# Patient Record
Sex: MALE
[De-identification: names, ages, dates, MRNs, and addresses within clinical notes are randomized per-mention and may not be internally consistent; named-entity substitution may affect disease eponyms.]

---

## 2017-04-18 ENCOUNTER — RESULT REVIEW (OUTPATIENT)
Age: 72
End: 2017-04-18

## 2019-12-06 ENCOUNTER — APPOINTMENT (OUTPATIENT)
Dept: SURGICAL ONCOLOGY | Facility: CLINIC | Age: 74
End: 2019-12-06
Payer: MEDICARE

## 2019-12-06 VITALS
BODY MASS INDEX: 34.01 KG/M2 | HEART RATE: 58 BPM | WEIGHT: 265 LBS | DIASTOLIC BLOOD PRESSURE: 92 MMHG | RESPIRATION RATE: 16 BRPM | HEIGHT: 74 IN | OXYGEN SATURATION: 93 % | SYSTOLIC BLOOD PRESSURE: 159 MMHG

## 2019-12-06 PROCEDURE — 99215 OFFICE O/P EST HI 40 MIN: CPT

## 2019-12-06 RX ORDER — RIVAROXABAN 20 MG/1
20 TABLET, FILM COATED ORAL
Refills: 0 | Status: ACTIVE | COMMUNITY

## 2019-12-06 RX ORDER — SIMVASTATIN 40 MG/1
40 TABLET, FILM COATED ORAL
Refills: 0 | Status: ACTIVE | COMMUNITY

## 2019-12-06 RX ORDER — TAMSULOSIN HYDROCHLORIDE 0.4 MG/1
CAPSULE ORAL
Refills: 0 | Status: ACTIVE | COMMUNITY

## 2019-12-12 ENCOUNTER — OUTPATIENT (OUTPATIENT)
Dept: OUTPATIENT SERVICES | Facility: HOSPITAL | Age: 74
LOS: 1 days | End: 2019-12-12
Payer: MEDICARE

## 2019-12-12 ENCOUNTER — RESULT REVIEW (OUTPATIENT)
Age: 74
End: 2019-12-12

## 2019-12-12 DIAGNOSIS — M25.9 JOINT DISORDER, UNSPECIFIED: Chronic | ICD-10-CM

## 2019-12-12 DIAGNOSIS — C43.4 MALIGNANT MELANOMA OF SCALP AND NECK: ICD-10-CM

## 2019-12-12 PROCEDURE — 88321 CONSLTJ&REPRT SLD PREP ELSWR: CPT

## 2019-12-16 LAB — SURGICAL PATHOLOGY STUDY: SIGNIFICANT CHANGE UP

## 2019-12-24 ENCOUNTER — TRANSCRIPTION ENCOUNTER (OUTPATIENT)
Age: 74
End: 2019-12-24

## 2020-01-07 ENCOUNTER — APPOINTMENT (OUTPATIENT)
Dept: PLASTIC SURGERY | Facility: CLINIC | Age: 75
End: 2020-01-07
Payer: MEDICARE

## 2020-01-07 VITALS — HEIGHT: 74 IN | WEIGHT: 260 LBS | BODY MASS INDEX: 33.37 KG/M2

## 2020-01-07 PROCEDURE — 99203 OFFICE O/P NEW LOW 30 MIN: CPT

## 2020-01-12 NOTE — PHYSICAL EXAM
[NI] : Normal [de-identified] : scalp midline  biopsy site, without any residual pigmentation or overlying eschar,

## 2020-01-12 NOTE — REASON FOR VISIT
[Consultation] : a consultation visit [FreeTextEntry1] : Pt presents in the office today by request of Dr. Galindo for melanoma on left scalp. Pt states that his dermatologist discovered it around October and had it biopsied in November which results confirmed melanoma. Pt states he has surgery scheduled 1/27/2020 with Dr. Galindo for wide excision of left scalp melanoma. Pt also states he has had no previous treatment prior to this.

## 2020-01-12 NOTE — HISTORY OF PRESENT ILLNESS
[FreeTextEntry1] : 74-year-old man referred by Dr. 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 Patient presents to discuss reconstruction options.\par \par This was an asymptomatic pigmented lesion discovered on a semiannual dermatology evaluation.\par \par +PRIOR HX:\par ~ I excised an in situ melanoma from behind the right ear, with negative margins, and primary closure.\par No other personal history of cutaneous malignancy.\par \par No other personal history of cancer.\par \par No relatives with skin cancer.\par \par The only relative with a history of malignancy is a sister who  at age 16 of sarcoma.\par \par \par His internist recently retired.\par \par His cardiologist is Dr. Jorge ALLAN\par \par +Atrial fibrillation, no neurovascular events.\par +XARELTO\par \par Blood pressure was controlled with Toprol and enalapril\par Zocor for hyperlipidemia.\par Flomax, And finasteride for BPH\par His urologist is Dr. Ruben Ibarra\par \par 2018, right total hip replacement.\par \par He has yearly eye examinations with Dr. Jones\par \par 2017 colonoscopy: Benign cecal polypectomy, He does not recall the physician's name. \par  \par Past Medical History\par Unreviewed Unverified: History of Enlarged prostate (600.00) (N40.0)\par Unreviewed Unverified: History of hiatal hernia (V12.79) (Z87.19)\par Unreviewed Unverified: History of irregular heartbeat (V12.59) (Z86.79)\par Unreviewed Unverified: History of vascular disorder (V12.50) (Z86.79)\par \par

## 2020-01-14 ENCOUNTER — FORM ENCOUNTER (OUTPATIENT)
Age: 75
End: 2020-01-14

## 2020-01-15 ENCOUNTER — OUTPATIENT (OUTPATIENT)
Dept: OUTPATIENT SERVICES | Facility: HOSPITAL | Age: 75
LOS: 1 days | End: 2020-01-15
Payer: MEDICARE

## 2020-01-15 VITALS
WEIGHT: 263.89 LBS | DIASTOLIC BLOOD PRESSURE: 90 MMHG | OXYGEN SATURATION: 98 % | HEART RATE: 56 BPM | TEMPERATURE: 97 F | RESPIRATION RATE: 16 BRPM | SYSTOLIC BLOOD PRESSURE: 149 MMHG | HEIGHT: 72 IN

## 2020-01-15 DIAGNOSIS — Z98.890 OTHER SPECIFIED POSTPROCEDURAL STATES: Chronic | ICD-10-CM

## 2020-01-15 DIAGNOSIS — Z91.89 OTHER SPECIFIED PERSONAL RISK FACTORS, NOT ELSEWHERE CLASSIFIED: ICD-10-CM

## 2020-01-15 DIAGNOSIS — C43.4 MALIGNANT MELANOMA OF SCALP AND NECK: ICD-10-CM

## 2020-01-15 DIAGNOSIS — Z96.649 PRESENCE OF UNSPECIFIED ARTIFICIAL HIP JOINT: Chronic | ICD-10-CM

## 2020-01-15 DIAGNOSIS — Z01.818 ENCOUNTER FOR OTHER PREPROCEDURAL EXAMINATION: ICD-10-CM

## 2020-01-15 DIAGNOSIS — M25.9 JOINT DISORDER, UNSPECIFIED: Chronic | ICD-10-CM

## 2020-01-15 DIAGNOSIS — I48.91 UNSPECIFIED ATRIAL FIBRILLATION: ICD-10-CM

## 2020-01-15 DIAGNOSIS — C43.9 MALIGNANT MELANOMA OF SKIN, UNSPECIFIED: ICD-10-CM

## 2020-01-15 LAB
ANION GAP SERPL CALC-SCNC: 9 MMOL/L — SIGNIFICANT CHANGE UP (ref 5–17)
BUN SERPL-MCNC: 10 MG/DL — SIGNIFICANT CHANGE UP (ref 7–23)
CALCIUM SERPL-MCNC: 9.9 MG/DL — SIGNIFICANT CHANGE UP (ref 8.4–10.5)
CHLORIDE SERPL-SCNC: 105 MMOL/L — SIGNIFICANT CHANGE UP (ref 96–108)
CO2 SERPL-SCNC: 27 MMOL/L — SIGNIFICANT CHANGE UP (ref 22–31)
CREAT SERPL-MCNC: 0.78 MG/DL — SIGNIFICANT CHANGE UP (ref 0.5–1.3)
GLUCOSE SERPL-MCNC: 102 MG/DL — HIGH (ref 70–99)
HCT VFR BLD CALC: 47.4 % — SIGNIFICANT CHANGE UP (ref 39–50)
HGB BLD-MCNC: 15 G/DL — SIGNIFICANT CHANGE UP (ref 13–17)
MCHC RBC-ENTMCNC: 29.7 PG — SIGNIFICANT CHANGE UP (ref 27–34)
MCHC RBC-ENTMCNC: 31.6 GM/DL — LOW (ref 32–36)
MCV RBC AUTO: 93.9 FL — SIGNIFICANT CHANGE UP (ref 80–100)
PLATELET # BLD AUTO: 228 K/UL — SIGNIFICANT CHANGE UP (ref 150–400)
POTASSIUM SERPL-MCNC: 4.7 MMOL/L — SIGNIFICANT CHANGE UP (ref 3.5–5.3)
POTASSIUM SERPL-SCNC: 4.7 MMOL/L — SIGNIFICANT CHANGE UP (ref 3.5–5.3)
RBC # BLD: 5.05 M/UL — SIGNIFICANT CHANGE UP (ref 4.2–5.8)
RBC # FLD: 13.2 % — SIGNIFICANT CHANGE UP (ref 10.3–14.5)
SODIUM SERPL-SCNC: 141 MMOL/L — SIGNIFICANT CHANGE UP (ref 135–145)
WBC # BLD: 7.61 K/UL — SIGNIFICANT CHANGE UP (ref 3.8–10.5)
WBC # FLD AUTO: 7.61 K/UL — SIGNIFICANT CHANGE UP (ref 3.8–10.5)

## 2020-01-15 PROCEDURE — 80048 BASIC METABOLIC PNL TOTAL CA: CPT

## 2020-01-15 PROCEDURE — 85027 COMPLETE CBC AUTOMATED: CPT

## 2020-01-15 PROCEDURE — G0463: CPT

## 2020-01-15 PROCEDURE — 71046 X-RAY EXAM CHEST 2 VIEWS: CPT | Mod: 26

## 2020-01-15 PROCEDURE — 71046 X-RAY EXAM CHEST 2 VIEWS: CPT

## 2020-01-15 RX ORDER — SODIUM CHLORIDE 9 MG/ML
3 INJECTION INTRAMUSCULAR; INTRAVENOUS; SUBCUTANEOUS EVERY 8 HOURS
Refills: 0 | Status: DISCONTINUED | OUTPATIENT
Start: 2020-01-27 | End: 2020-02-11

## 2020-01-15 RX ORDER — LIDOCAINE HCL 20 MG/ML
0.2 VIAL (ML) INJECTION ONCE
Refills: 0 | Status: DISCONTINUED | OUTPATIENT
Start: 2020-01-27 | End: 2020-02-11

## 2020-01-15 NOTE — H&P PST ADULT - HISTORY OF PRESENT ILLNESS
73 yo male with h/o paroxysmal atrial fibrillation, HTN, BPH and malignant melanoma (2005 in situ s/p excision from behind right ear and current on the left scalp) is scheduled for Wide Excision of the Left Scalp Melanoma on 1/27/2020.

## 2020-01-15 NOTE — H&P PST ADULT - NSICDXFAMILYHX_GEN_ALL_CORE_FT
FAMILY HISTORY:  Mother  Still living? Unknown  Family history of cardiac disorder, Age at diagnosis: Age Unknown

## 2020-01-15 NOTE — H&P PST ADULT - NSICDXPASTSURGICALHX_GEN_ALL_CORE_FT
PAST SURGICAL HISTORY:  Unspecified ankle disorder PAST SURGICAL HISTORY:  H/O colonoscopy with polypectomy     H/O lithotripsy     History of melanoma excision 2005 - behind right ear    S/P hip replacement 2019 - right    S/P inguinal hernia repair right

## 2020-01-15 NOTE — H&P PST ADULT - NSANTHOSAYNRD_GEN_A_CORE
No. MARIO screening performed.  STOP BANG Legend: 0-2 = LOW Risk; 3-4 = INTERMEDIATE Risk; 5-8 = HIGH Risk No. MARIO screening performed.  STOP BANG Legend: 0-2 = LOW Risk; 3-4 = INTERMEDIATE Risk; 5-8 = HIGH Risk/neck = 18.25 inches

## 2020-01-15 NOTE — H&P PST ADULT - NSICDXPROBLEM_GEN_ALL_CORE_FT
PROBLEM DIAGNOSES  Problem: Melanoma  Assessment and Plan: Wide Exision Left Scalp Melanoma    Problem: Atrial fibrillation  Assessment and Plan: Pt's Cardiologist has stated that pt may hold xarelto 2 days preop. However per Dr Galindo's office pt may continue xarelto to OR. Pt will continue xarelto preoperatively.    Problem: At risk for sleep apnea  Assessment and Plan: MARIO Precautions  OR Booking notified

## 2020-01-15 NOTE — H&P PST ADULT - ATTENDING COMMENTS
74-year-old man with an uncomplicated 0.23 mm melanoma of the left scalp, scheduled for appropriate excision, with reconstruction by Dr. Bruce Hackett.    His diagnosis and planned management were reviewed with him my office, and again on morning of surgery.     All questions answered, consent on chart

## 2020-01-17 PROBLEM — C43.9 MALIGNANT MELANOMA OF SKIN, UNSPECIFIED: Chronic | Status: ACTIVE | Noted: 2020-01-15

## 2020-01-17 PROBLEM — N40.0 BENIGN PROSTATIC HYPERPLASIA WITHOUT LOWER URINARY TRACT SYMPTOMS: Chronic | Status: ACTIVE | Noted: 2020-01-15

## 2020-01-19 ENCOUNTER — FORM ENCOUNTER (OUTPATIENT)
Age: 75
End: 2020-01-19

## 2020-01-20 ENCOUNTER — APPOINTMENT (OUTPATIENT)
Dept: CT IMAGING | Facility: CLINIC | Age: 75
End: 2020-01-20
Payer: MEDICARE

## 2020-01-20 ENCOUNTER — OUTPATIENT (OUTPATIENT)
Dept: OUTPATIENT SERVICES | Facility: HOSPITAL | Age: 75
LOS: 1 days | End: 2020-01-20

## 2020-01-20 DIAGNOSIS — Z98.890 OTHER SPECIFIED POSTPROCEDURAL STATES: Chronic | ICD-10-CM

## 2020-01-20 DIAGNOSIS — Z96.649 PRESENCE OF UNSPECIFIED ARTIFICIAL HIP JOINT: Chronic | ICD-10-CM

## 2020-01-20 DIAGNOSIS — C43.4 MALIGNANT MELANOMA OF SCALP AND NECK: ICD-10-CM

## 2020-01-20 PROCEDURE — 71260 CT THORAX DX C+: CPT | Mod: 26

## 2020-01-26 ENCOUNTER — TRANSCRIPTION ENCOUNTER (OUTPATIENT)
Age: 75
End: 2020-01-26

## 2020-01-27 ENCOUNTER — APPOINTMENT (OUTPATIENT)
Dept: SURGICAL ONCOLOGY | Facility: HOSPITAL | Age: 75
End: 2020-01-27

## 2020-01-27 ENCOUNTER — OUTPATIENT (OUTPATIENT)
Dept: OUTPATIENT SERVICES | Facility: HOSPITAL | Age: 75
LOS: 1 days | End: 2020-01-27
Payer: MEDICARE

## 2020-01-27 ENCOUNTER — RESULT REVIEW (OUTPATIENT)
Age: 75
End: 2020-01-27

## 2020-01-27 VITALS
HEART RATE: 48 BPM | SYSTOLIC BLOOD PRESSURE: 150 MMHG | WEIGHT: 263.89 LBS | OXYGEN SATURATION: 97 % | RESPIRATION RATE: 14 BRPM | TEMPERATURE: 98 F | HEIGHT: 72 IN | DIASTOLIC BLOOD PRESSURE: 87 MMHG

## 2020-01-27 VITALS
HEART RATE: 55 BPM | DIASTOLIC BLOOD PRESSURE: 70 MMHG | RESPIRATION RATE: 17 BRPM | SYSTOLIC BLOOD PRESSURE: 143 MMHG | TEMPERATURE: 99 F | OXYGEN SATURATION: 97 %

## 2020-01-27 DIAGNOSIS — Z98.890 OTHER SPECIFIED POSTPROCEDURAL STATES: Chronic | ICD-10-CM

## 2020-01-27 DIAGNOSIS — Z96.649 PRESENCE OF UNSPECIFIED ARTIFICIAL HIP JOINT: Chronic | ICD-10-CM

## 2020-01-27 DIAGNOSIS — C43.4 MALIGNANT MELANOMA OF SCALP AND NECK: ICD-10-CM

## 2020-01-27 PROCEDURE — 15002 WOUND PREP TRK/ARM/LEG: CPT

## 2020-01-27 PROCEDURE — 11624 EXC S/N/H/F/G MAL+MRG 3.1-4: CPT

## 2020-01-27 PROCEDURE — 15220 FTH/GFT FR S/A/L 20 SQ CM/<: CPT

## 2020-01-27 PROCEDURE — 11624 EXC S/N/H/F/G MAL+MRG 3.1-4: CPT | Mod: XS

## 2020-01-27 RX ORDER — FINASTERIDE 5 MG/1
1 TABLET, FILM COATED ORAL
Qty: 0 | Refills: 0 | DISCHARGE

## 2020-01-27 RX ORDER — OXYCODONE HYDROCHLORIDE 5 MG/1
5 TABLET ORAL ONCE
Refills: 0 | Status: DISCONTINUED | OUTPATIENT
Start: 2020-01-27 | End: 2020-01-27

## 2020-01-27 RX ORDER — CELECOXIB 200 MG/1
200 CAPSULE ORAL ONCE
Refills: 0 | Status: COMPLETED | OUTPATIENT
Start: 2020-01-27 | End: 2020-01-27

## 2020-01-27 RX ORDER — SIMVASTATIN 20 MG/1
1 TABLET, FILM COATED ORAL
Qty: 0 | Refills: 0 | DISCHARGE

## 2020-01-27 RX ORDER — HYDROMORPHONE HYDROCHLORIDE 2 MG/ML
0.25 INJECTION INTRAMUSCULAR; INTRAVENOUS; SUBCUTANEOUS
Refills: 0 | Status: DISCONTINUED | OUTPATIENT
Start: 2020-01-27 | End: 2020-01-27

## 2020-01-27 RX ORDER — CELECOXIB 200 MG/1
200 CAPSULE ORAL ONCE
Refills: 0 | Status: DISCONTINUED | OUTPATIENT
Start: 2020-01-27 | End: 2020-02-11

## 2020-01-27 RX ORDER — RIVAROXABAN 15 MG-20MG
1 KIT ORAL
Qty: 0 | Refills: 0 | DISCHARGE

## 2020-01-27 RX ORDER — CHLORHEXIDINE GLUCONATE 213 G/1000ML
1 SOLUTION TOPICAL ONCE
Refills: 0 | Status: COMPLETED | OUTPATIENT
Start: 2020-01-27 | End: 2020-01-27

## 2020-01-27 RX ORDER — METOPROLOL TARTRATE 50 MG
150 TABLET ORAL
Qty: 0 | Refills: 0 | DISCHARGE

## 2020-01-27 RX ORDER — ACETAMINOPHEN 500 MG
1000 TABLET ORAL ONCE
Refills: 0 | Status: COMPLETED | OUTPATIENT
Start: 2020-01-27 | End: 2020-01-27

## 2020-01-27 RX ORDER — TAMSULOSIN HYDROCHLORIDE 0.4 MG/1
1 CAPSULE ORAL
Qty: 0 | Refills: 0 | DISCHARGE

## 2020-01-27 RX ORDER — ONDANSETRON 8 MG/1
4 TABLET, FILM COATED ORAL ONCE
Refills: 0 | Status: DISCONTINUED | OUTPATIENT
Start: 2020-01-27 | End: 2020-02-11

## 2020-01-27 RX ORDER — SODIUM CHLORIDE 9 MG/ML
1000 INJECTION, SOLUTION INTRAVENOUS
Refills: 0 | Status: DISCONTINUED | OUTPATIENT
Start: 2020-01-27 | End: 2020-02-11

## 2020-01-27 RX ADMIN — CELECOXIB 200 MILLIGRAM(S): 200 CAPSULE ORAL at 06:22

## 2020-01-27 RX ADMIN — CHLORHEXIDINE GLUCONATE 1 APPLICATION(S): 213 SOLUTION TOPICAL at 06:25

## 2020-01-27 RX ADMIN — Medication 1000 MILLIGRAM(S): at 06:22

## 2020-01-27 NOTE — ASU DISCHARGE PLAN (ADULT/PEDIATRIC) - CARE PROVIDER_API CALL
Bruce Hackett)  Plastic Surgery  [address], [address]  [place] [address]  Phone: [phone]  Fax: [phone]  Follow Up Time:     Kirk Galindo)  Surgery  [address] [place] [address]  Phone: [phone]  Fax: [phone]  Follow Up Time:

## 2020-01-27 NOTE — PRE-ANESTHESIA EVALUATION ADULT - NSANTHOSAYNRD_GEN_A_CORE
No. MARIO screening performed.  STOP BANG Legend: 0-2 = LOW Risk; 3-4 = INTERMEDIATE Risk; 5-8 = HIGH Risk/neck = 18.25 inches

## 2020-01-27 NOTE — ASU PATIENT PROFILE, ADULT - PSH
H/O colonoscopy with polypectomy    H/O lithotripsy    History of melanoma excision  2005 - behind right ear  S/P hip replacement  2019 - right  S/P inguinal hernia repair  right

## 2020-01-27 NOTE — ASU DISCHARGE PLAN (ADULT/PEDIATRIC) - PROCEDURE
Excision of melanoma from left scalp with reconstruction Excision of melanoma from left scalp with reconstruction with full thickness skin graft

## 2020-01-27 NOTE — ASU DISCHARGE PLAN (ADULT/PEDIATRIC) - BATHING
Keep scalp dressing dry. May allow water to run over left neck dressing in 24 hours. Do not scrub left neck dressing.

## 2020-01-27 NOTE — ASU DISCHARGE PLAN (ADULT/PEDIATRIC) - CALL YOUR DOCTOR IF YOU HAVE ANY OF THE FOLLOWING:
Numbness, tingling, color or temperature change to extremity/Excessive diarrhea/Wound/Surgical Site with redness, or foul smelling discharge or pus/Nausea and vomiting that does not stop/Swelling that gets worse/Pain not relieved by Medications/Increased irritability or sluggishness/Unable to urinate/Inability to tolerate liquids or foods/Bleeding that does not stop

## 2020-01-27 NOTE — BRIEF OPERATIVE NOTE - OPERATION/FINDINGS
Excision of 0.23 mm left scalp melanoma with a minimum 1 cm margin, and immediate reconstruction Excision of 0.23 mm left scalp melanoma with a minimum 1 cm margin, and immediate reconstruction with full thickness skin graft from left neck

## 2020-01-27 NOTE — ASU DISCHARGE PLAN (ADULT/PEDIATRIC) - ASU DC SPECIAL INSTRUCTIONSFT
Initial followup is with plastic surgery within the next 5-15 days.    Dr. Galindo should call with pathology report in approximately 2 weeks, that conversation will determine further management You have a yellow bolster dressing on your head. Apply over the coutner bacitracin ointment to it two times a day. Do not remove it. Do not get it wet. Dr. Hackett will remove it in the office.     Your neck incision is covered with a surgical tape called Steri Strips. The Steri Strips will remain in place until they fall off on their own or are removed in the office.    No exercising, strenuous activity, or heavy lifting. Keep your arms below your head and your elbows below your shoulders.    Please keep your head elevated while in bed.    You may take over the counter acetaminophen (Tylenol) or ibuprofen (Motrin) as needed for pain.    Please follow up with Dr. Hackett 7-10 days after discharge from the hospital. You may call [phone] to schedule an appointment.    Dr. Galindo should call with pathology report in approximately 2 weeks, that conversation will determine further management You have a yellow bolster dressing on your head. Apply over the counter bacitracin ointment to it two times a day. Do not remove it. Do not get it wet. Dr. Hackett will remove it in the office.     Your neck incision is covered with a surgical tape called Steri Strips. The Steri Strips will remain in place until they fall off on their own or are removed in the office.    No exercising, strenuous activity, or heavy lifting. Keep your arms below your head and your elbows below your shoulders.    Please keep your head elevated while in bed.    You may take over the counter acetaminophen (Tylenol) or ibuprofen (Motrin) as needed for pain.    Please follow up with Dr. Hackett 7-10 days after discharge from the hospital. You may call [phone] to schedule an appointment.    Dr. Galindo should call with pathology report in approximately 2 weeks, that conversation will determine further management

## 2020-02-10 LAB — SURGICAL PATHOLOGY STUDY: SIGNIFICANT CHANGE UP

## 2020-02-11 ENCOUNTER — APPOINTMENT (OUTPATIENT)
Dept: PLASTIC SURGERY | Facility: CLINIC | Age: 75
End: 2020-02-11
Payer: MEDICARE

## 2020-02-11 PROCEDURE — 99024 POSTOP FOLLOW-UP VISIT: CPT

## 2020-02-11 NOTE — REASON FOR VISIT
[Post Op: _________] : a [unfilled] post op visit [FreeTextEntry1] : DOS 1/27/20 s/p wide excision of left scalp melanoma. Pt states that he is doing well.

## 2020-02-11 NOTE — HISTORY OF PRESENT ILLNESS
[FreeTextEntry1] : 74 year male presents for follow up status post WLE of scalp melanoma with full thickness skin graft reconstruction. Patient is doing well no fever no chills no drainage pain well-controlled

## 2020-02-14 NOTE — ASSESSMENT
[FreeTextEntry1] : I reviewed the recently diagnosed in (0.23 mm melanoma of the left scalp, and the need for appropriate excision which will be coordinated with plastic surgery.\par \par He'll have a chest x-ray with presurgical testing.\par \par Reviewed in detail, all questions answered.\par \par \par (01-16-20.\par I called him but had to leave a voicemail.\par Chest x-ray yesterday at 450:\par Equivocal finding in the left chest for with CT chest with contrast is recommended.\par Prescription entered.)\par \par \par Note dictated\par \par \par 01-20-20.\par CT chest at [facility].\par Abnormality described on preoperative chest x-ray not present on CT scan.\par Incidental, 3 mm nodule RUL, and 2 very small nodules in the right major fissure.\par Will repeat in 6 months, July 2020.......................\par \par \par 02-13-20.\par I called him but had to leave a voicemail.\par January 27, 2020, he had wide excision of a > 0.23 mm melanoma from the left parietal scalp, with reconstruction by Dr. Tomy Hackett.\par Final pathology indicates a 2.1 mm melanoma, with negative margins of resection.\par He needs:\par #1. Bilateral neck ultrasound.. (Below)\par #2. If unremarkable, sentinel node biopsy.\par \par \par 02-14-20.\par We spoke.\par I reviewed the above information.\par Request for bilateral neck ultrasound entered.\par My office will call to schedule a postoperative/preoperative visit.\par We will ultimately, likely, require sentinel node mapping and biopsy to complete staging of his melanoma.\par Note dictated

## 2020-02-14 NOTE — HISTORY OF PRESENT ILLNESS
[de-identified] : 74-year-old man referred by his dermatologist Dr. Alberto Hargrove with an uncomplicated 0.23 mm (level 2/3) superficial spreading melanoma of his LEFT SCALP.\par \par This was an asymptomatic pigmented lesion discovered on a semiannual dermatology evaluation.\par \par +PRIOR HX:\par ~ I excised an in situ melanoma from behind the right ear, with negative margins, and primary closure.\par No other personal history of cutaneous malignancy.\par \par No other personal history of cancer.\par \par No relatives with skin cancer.\par \par The only relative with a history of malignancy is a sister who  at age 16 of sarcoma.\par \par \par His internist recently retired.\par \par His cardiologist is Dr. Jorge ALLAN\par \par +Atrial fibrillation, no neurovascular events.\par +XARELTO\par \par Blood pressure was controlled with Toprol and enalapril\par Zocor for hyperlipidemia.\par Flomax, And finasteride for BPH\par His urologist is Dr. Ruben Ibarra\par \par 2018, right total hip replacement.\par \par He has yearly eye examinations with Dr. Jones\par \par 2017 colonoscopy: Benign cecal polypectomy, He does not recall the physician's name.

## 2020-02-14 NOTE — PHYSICAL EXAM
[Normal] : supple, no neck mass and thyroid not enlarged [Normal Neck Lymph Nodes] : normal neck lymph nodes  [Normal Supraclavicular Lymph Nodes] : normal supraclavicular lymph nodes [Normal Axillary Lymph Nodes] : normal axillary lymph nodes [Normal] : full range of motion and no deformities appreciated [de-identified] : groins not examined [de-identified] : Below

## 2020-02-16 ENCOUNTER — FORM ENCOUNTER (OUTPATIENT)
Age: 75
End: 2020-02-16

## 2020-02-17 ENCOUNTER — APPOINTMENT (OUTPATIENT)
Dept: ULTRASOUND IMAGING | Facility: CLINIC | Age: 75
End: 2020-02-17
Payer: MEDICARE

## 2020-02-17 ENCOUNTER — OUTPATIENT (OUTPATIENT)
Dept: OUTPATIENT SERVICES | Facility: HOSPITAL | Age: 75
LOS: 1 days | End: 2020-02-17

## 2020-02-17 DIAGNOSIS — C43.4 MALIGNANT MELANOMA OF SCALP AND NECK: ICD-10-CM

## 2020-02-17 DIAGNOSIS — Z98.890 OTHER SPECIFIED POSTPROCEDURAL STATES: Chronic | ICD-10-CM

## 2020-02-17 DIAGNOSIS — Z96.649 PRESENCE OF UNSPECIFIED ARTIFICIAL HIP JOINT: Chronic | ICD-10-CM

## 2020-02-17 PROCEDURE — 76536 US EXAM OF HEAD AND NECK: CPT | Mod: 26

## 2020-02-21 ENCOUNTER — APPOINTMENT (OUTPATIENT)
Dept: SURGICAL ONCOLOGY | Facility: CLINIC | Age: 75
End: 2020-02-21
Payer: MEDICARE

## 2020-02-21 VITALS
RESPIRATION RATE: 16 BRPM | BODY MASS INDEX: 34.01 KG/M2 | HEART RATE: 50 BPM | WEIGHT: 265 LBS | SYSTOLIC BLOOD PRESSURE: 166 MMHG | OXYGEN SATURATION: 97 % | HEIGHT: 74 IN | DIASTOLIC BLOOD PRESSURE: 91 MMHG

## 2020-02-21 PROCEDURE — 99215 OFFICE O/P EST HI 40 MIN: CPT | Mod: 24

## 2020-02-25 ENCOUNTER — APPOINTMENT (OUTPATIENT)
Dept: PLASTIC SURGERY | Facility: CLINIC | Age: 75
End: 2020-02-25
Payer: MEDICARE

## 2020-02-25 VITALS
WEIGHT: 265 LBS | DIASTOLIC BLOOD PRESSURE: 82 MMHG | TEMPERATURE: 97.5 F | SYSTOLIC BLOOD PRESSURE: 134 MMHG | HEART RATE: 52 BPM | OXYGEN SATURATION: 95 % | HEIGHT: 74 IN | BODY MASS INDEX: 34.01 KG/M2

## 2020-02-25 DIAGNOSIS — C43.4 MALIGNANT MELANOMA OF SCALP AND NECK: ICD-10-CM

## 2020-02-25 PROCEDURE — 99024 POSTOP FOLLOW-UP VISIT: CPT

## 2020-02-28 PROBLEM — C43.4 MELANOMA OF SCALP: Status: ACTIVE | Noted: 2019-12-06

## 2020-03-09 NOTE — ASSESSMENT
[FreeTextEntry1] : We discussed the recent upstaging from a thin to an intermediate thickness melanoma (left parietal scalp.\par \par Margins of resection are negative.\par \par No evidence of distant metastatic disease.\par No sonographic evidence of cervical adenopathy.\par Physical examination otherwise normal.\par \par Presented the indications and technique for sentinel node mapping and biopsy.\par \par They understand, and would like to proceed with sentinel node biopsy, paperwork submitted\par Reviewed in detail, all questions answered.\par \par \par 03-09-20.\par Patient called to advise us that he'll be pursuing the remainder of his care elsewhere - [facility] [place]..\par Note dictated.

## 2020-03-09 NOTE — REVIEW OF SYSTEMS
[Negative] : Heme/Lymph [FreeTextEntry5] : Atrial fibrillation [FreeTextEntry8] : Cecal polyp [FreeTextEntry9] : BPH [de-identified] : Arthritis [de-identified] : Melanoma

## 2020-03-09 NOTE — PHYSICAL EXAM
[Normal] : supple, no neck mass and thyroid not enlarged [Normal Neck Lymph Nodes] : normal neck lymph nodes  [Normal Supraclavicular Lymph Nodes] : normal supraclavicular lymph nodes [Normal Axillary Lymph Nodes] : normal axillary lymph nodes [Normal] : full range of motion and no deformities appreciated [de-identified] : groins not examined [de-identified] : Below

## 2020-03-09 NOTE — REASON FOR VISIT
[Post-Op] : a post-op for [Other: _____] : [unfilled] [FreeTextEntry2] : Recently upstaged to a T3 melanoma of the left scalp

## 2020-03-09 NOTE — HISTORY OF PRESENT ILLNESS
[de-identified] : First postoperative visit for this 75 year-old man recently diagnosed with an INTERMEDIATE THICKNESS MELANOMA (2.1 mm) of his LEFT PARIETAL SCALP.\par \par This represented an UP-STAGING from a depth of > 0.23 mm melanoma which was excised, with negative margins, and reconstruction by Dr. Bruce Hackett 2020.\par \par 2020:\par Preoperative chest x-ray had an equivocal finding in the left chest.\par Subsequent CT chest (also 2020) did not locate the above finding on chest radiograph.\par Incidentally noted were subcentimeter nodules in the right lobe.\par These will be reevaluated at a 6 month interval, prescription entered today for 2020.\par \par Postoperatively he had a bilateral neck ultrasound:\par NO cervical adenopathy.\par Incidental, bilateral, subcentimeter thyroid nodules.\par \par + Prior melanoma:\par : Right retroauricular melanoma (below).\par \par \par 2019, he was referred by his dermatologist Dr. Alberto Hargrove with an uncomplicated 0.23 mm (levelI/III) superficial spreading melanoma of his LEFT SCALP.\par \par This was an asymptomatic pigmented lesion discovered on a semi-annual dermatology evaluation.\par \par +PRIOR HX:\par ~ I excised an in situ melanoma from behind the right ear, with negative margins, and primary closure.\par No other personal history of cutaneous malignancy.\par \par No other personal history of cancer.\par \par No relatives with skin cancer.\par \par The only relative with a history of malignancy is a sister who  at age 16 of sarcoma.\par \par \par His internist recently retired.\par \par His cardiologist is Dr. Jorge ALLAN\par \par +Atrial fibrillation, no neurovascular events.\par +XARELTO\par \par No pacemaker or Defibrillator\par \par Blood pressure was controlled with Toprol and enalapril\par Zocor for hyperlipidemia.\par \par Flomax, And finasteride for BPH\par His urologist is Dr. Ruben Ibarra\par \par 2018, right total hip replacement.\par \par He has yearly eye examinations with Dr. Jones\par \par 2017 colonoscopy: Benign cecal polypectomy, He does not recall the physician's name.

## 2020-03-24 ENCOUNTER — APPOINTMENT (OUTPATIENT)
Dept: NUCLEAR MEDICINE | Facility: HOSPITAL | Age: 75
End: 2020-03-24

## 2020-03-24 ENCOUNTER — APPOINTMENT (OUTPATIENT)
Dept: SURGICAL ONCOLOGY | Facility: HOSPITAL | Age: 75
End: 2020-03-24

## 2021-08-13 NOTE — REVIEW OF SYSTEMS
[Negative] : Heme/Lymph [FreeTextEntry5] : Atrial fibrillation [de-identified] : Melanoma [FreeTextEntry9] : BPH none

## 2024-01-03 NOTE — H&P PST ADULT - NSICDXPASTMEDICALHX_GEN_ALL_CORE_FT
Include Z78.9 (Other Specified Conditions Influencing Health Status) As An Associated Diagnosis?: No How Many Mls Were Removed From The 40 Mg/Ml (10ml) Vial When Preparing The Injectable Solution?: 0 Consent: The risks of atrophy were reviewed with the patient. Concentration Of Kenalog Solution Injected (Mg/Ml): 10.0 Detail Level: Detailed Expiration Date For Kenalog (Optional): 11/2025, 8/2024 Total Volume (Ccs): 0.7 Show Inventory Tab: Hide Administered By (Optional): L[employer] Ndc# For Kenalog Only: [id number] Lot # For Kenalog (Optional): [id number], [id number] Kenalog Preparation: Kenalog Medical Necessity Clause: This procedure was medically necessary because the lesions that were treated were: Validate Note Data When Using Inventory: Yes Kenalog Type Of Vial: Multiple Dose PAST MEDICAL HISTORY:  Alcohol Abuse     Atrial fibrillation     Depression     HTN (Hypertension)     Hyperlipidemia PAST MEDICAL HISTORY:  Alcohol Abuse past history    Atrial fibrillation paroxysmal atrial fibrillation - on xarelto    BPH (benign prostatic hyperplasia)     Depression     HTN (Hypertension)     Hyperlipidemia     Malignant melanoma 2005 - behind right ear; current - left scalp